# Patient Record
Sex: MALE | Race: BLACK OR AFRICAN AMERICAN | NOT HISPANIC OR LATINO | URBAN - METROPOLITAN AREA
[De-identification: names, ages, dates, MRNs, and addresses within clinical notes are randomized per-mention and may not be internally consistent; named-entity substitution may affect disease eponyms.]

---

## 2018-09-01 ENCOUNTER — EMERGENCY (EMERGENCY)
Facility: HOSPITAL | Age: 23
LOS: 1 days | Discharge: HOME | End: 2018-09-01
Attending: EMERGENCY MEDICINE
Payer: COMMERCIAL

## 2018-09-01 VITALS
HEART RATE: 67 BPM | TEMPERATURE: 98 F | SYSTOLIC BLOOD PRESSURE: 141 MMHG | HEIGHT: 68 IN | RESPIRATION RATE: 16 BRPM | DIASTOLIC BLOOD PRESSURE: 79 MMHG | WEIGHT: 164.91 LBS | OXYGEN SATURATION: 98 %

## 2018-09-01 PROCEDURE — 12011 RPR F/E/E/N/L/M 2.5 CM/<: CPT

## 2018-09-01 PROCEDURE — 99283 EMERGENCY DEPT VISIT LOW MDM: CPT | Mod: 25

## 2018-09-01 NOTE — ED ADULT NURSE NOTE - NSIMPLEMENTINTERV_GEN_ALL_ED
Implemented All Universal Safety Interventions:  [place] to call system. Call bell, personal items and telephone within reach. Instruct patient to call for assistance. Room bathroom lighting operational. Non-slip footwear when patient is off stretcher. Physically safe environment: no spills, clutter or unnecessary equipment. Stretcher in lowest position, wheels locked, appropriate side rails in place.

## 2018-09-01 NOTE — ED PROVIDER NOTE - ATTENDING CONTRIBUTION TO CARE
I took part in the care and plan of the patient and agree with the disposition. I took part in the care and plan of the patient and agree key physical exam findings and with the disposition. I performed the initial face to face bedside interview with this patient regarding history of present illness, review of symptoms and past medical, social and family history.  I completed an independent physical examination.  I was the initial provider who evaluated this patient.  The history, review of symptoms and examination was documented by the scribe in my presence and I attest to the accuracy of the documentation.  I have signed out the follow up of any pending tests (i.e. labs, radiological studies) to the PA/NP.  I have discussed the patient’s plan of care and disposition with the PA/NP

## 2018-09-01 NOTE — ED ADULT NURSE NOTE - OBJECTIVE STATEMENT
pt is here for lacerations.  c/o pain 5/10, pt stated that I played baseball, denied LOC, denied fever or sob, pt calm at this time, right eye lacerations.

## 2018-09-01 NOTE — ED PROVIDER NOTE - OBJECTIVE STATEMENT
23 year-old male, no significant PMHx, presents with cc R eyebrow laceration s/p being accidnetally hit with elbow while playing basketball earlier today. Denies any facial pain, visual changes, eyeball pain, pain or inability with eye movement, neck/back pain, other injuries or any other complaints. Last tetanus 4 year ago. 23 year-old male, no significant PMHx, presents with cc R eyebrow laceration s/p being accidnetally hit with elbow while playing basketball earlier today. Denies any facial pain, visual changes, eyeball pain, pain or inability with eye movement, neck/back pain, other injuries or any other complaints. Last tetanus 4 year ago.    josselin; laceration 23 year-old male, no significant PMHx, presents with cc R eyebrow laceration s/p being accidnetally hit with elbow while playing basketball earlier today. Denies any facial pain, visual changes, eyeball pain, pain or inability with eye movement, neck/back pain, other injuries or any other complaints. Last tetanus 4 year ago.    josselin SCHROEDER attending; 23 year old male with right eye laceration while playuing basketball- not elbowed in eyebrow. no LOC. PE with 1.5 cm laceration to right eyebrow. PERRLA, EOMI. remainder of head is atraumatic. a/p sutures, dc. return for suture removal in 5-7 days.

## 2018-09-01 NOTE — ED PROVIDER NOTE - PHYSICAL EXAMINATION
R eyebrow 1.5 cm superficial laceration. No maxillofacial bony tenderness. Pupils 5 mm with PERRL bilaterally. Neck supple with full range of motion. No spinal/paraspinal tenderness to palpation. No epistaxis or septal hematoma. No nasal bone tenderness or deformity. R eyebrow 1.5 cm superficial laceration. No maxillofacial bony tenderness. Pupils 5 mm with PERRL bilaterally. Neck supple with full range of motion. No spinal/paraspinal tenderness to palpation. No epistaxis or septal hematoma. No nasal bone tenderness or deformity.    josselin; right eyebrow laceration 1.5cm

## 2018-09-06 ENCOUNTER — EMERGENCY (EMERGENCY)
Facility: HOSPITAL | Age: 23
LOS: 1 days | Discharge: HOME | End: 2018-09-06
Attending: EMERGENCY MEDICINE
Payer: COMMERCIAL

## 2018-09-06 VITALS — WEIGHT: 160.06 LBS | HEIGHT: 69 IN

## 2018-09-06 VITALS
RESPIRATION RATE: 18 BRPM | OXYGEN SATURATION: 99 % | SYSTOLIC BLOOD PRESSURE: 116 MMHG | TEMPERATURE: 98 F | DIASTOLIC BLOOD PRESSURE: 67 MMHG | HEART RATE: 72 BPM

## 2018-09-06 PROCEDURE — G0463: CPT

## 2018-09-06 NOTE — ED PROVIDER NOTE - SKIN, MLM
Skin normal color for race, warm, dry and intact. No evidence of rash. 3 sutures present on right eyebrow.

## 2018-09-06 NOTE — ED PROVIDER NOTE - OBJECTIVE STATEMENT
24 y/o M with no significant PMHx or PSHx presents to the ED for suture removal. Patient states he was elbowed while playing basketball 5 days ago, sustaining laceration to right eyebrow. Patient denies fever, chills or any other complaints. Allergies: PCN.

## 2021-02-23 ENCOUNTER — APPOINTMENT (OUTPATIENT)
Dept: UROLOGY | Facility: CLINIC | Age: 26
End: 2021-02-23
Payer: COMMERCIAL

## 2021-02-23 VITALS
BODY MASS INDEX: 24.44 KG/M2 | HEART RATE: 44 BPM | SYSTOLIC BLOOD PRESSURE: 126 MMHG | TEMPERATURE: 98.7 F | WEIGHT: 165 LBS | HEIGHT: 69 IN | DIASTOLIC BLOOD PRESSURE: 64 MMHG

## 2021-02-23 PROCEDURE — 99072 ADDL SUPL MATRL&STAF TM PHE: CPT

## 2021-02-23 PROCEDURE — 99204 OFFICE O/P NEW MOD 45 MIN: CPT

## 2021-02-23 NOTE — HISTORY OF PRESENT ILLNESS
[FreeTextEntry1] : 24 y/o male presenting for initial visit for testicular lump. Pt had physical yesterday, incidental finding of right testicular lump by PMD. Pt was suggested to see a urologist. Pertinent medical history does not included DM, HTN, or HLD.\par \par Denies hematuria, dysuria, urgency and hesitancy. Good stream. \par \par O/E: circumcised phallus, adequate meatus, both testes descended, nontender, no mass palpable; 1 ml hard nodule on  posterior upper pole of left testes. No varicocele.\par FAINA: deferred \par \par Will get testicular sonogram, UA and culture\par Follow up in 1 week\par

## 2021-02-23 NOTE — ADDENDUM
[FreeTextEntry1] : I, Adri Innocent, acted solely as a scribe for Dr. Julián Pastor on this date, 02/23/2021.\par \par All medical record entries made by the Scribe were at my Dr. Julián Pastor direction and personally dictated by me on, 02/23/2021. I have reviewed the chart and agree that the record accurately reflects my personal performance of the history, physical exam, assessment and plan. I have also personally directed, reviewed and agreed with the chart.\par

## 2021-02-23 NOTE — PHYSICAL EXAM
[General Appearance - Well Developed] : well developed [General Appearance - Well Nourished] : well nourished [Normal Appearance] : normal appearance [Well Groomed] : well groomed [General Appearance - In No Acute Distress] : no acute distress [Edema] : no peripheral edema [Respiration, Rhythm And Depth] : normal respiratory rhythm and effort [Exaggerated Use Of Accessory Muscles For Inspiration] : no accessory muscle use [Abdomen Soft] : soft [Abdomen Tenderness] : non-tender [Costovertebral Angle Tenderness] : no ~M costovertebral angle tenderness [Urethral Meatus] : meatus normal [Urinary Bladder Findings] : the bladder was normal on palpation [Scrotum] : the scrotum was normal [Normal Station and Gait] : the gait and station were normal for the patient's age [] : no rash [No Focal Deficits] : no focal deficits [Oriented To Time, Place, And Person] : oriented to person, place, and time [Affect] : the affect was normal [Mood] : the mood was normal [Not Anxious] : not anxious [No Palpable Adenopathy] : no palpable adenopathy [Penis Abnormality] : normal circumcised penis [Epididymis] : the epididymides were normal [Testes Tenderness] : no tenderness of the testes [FreeTextEntry1] : 1 ml hard nodule on left posterior testes; FAINA: deferred

## 2021-02-23 NOTE — LETTER BODY
[Dear  ___] : Dear  [unfilled], [Consult Letter:] : I had the pleasure of evaluating your patient, [unfilled]. [Please see my note below.] : Please see my note below. [Consult Closing:] : Thank you very much for allowing me to participate in the care of this patient.  If you have any questions, please do not hesitate to contact me. [Sincerely,] : Sincerely, [FreeTextEntry3] : Julián Pastor MD

## 2021-02-24 LAB
APPEARANCE: CLEAR
BACTERIA: NEGATIVE
BILIRUBIN URINE: NEGATIVE
BLOOD URINE: ABNORMAL
COLOR: NORMAL
GLUCOSE QUALITATIVE U: NEGATIVE
HYALINE CASTS: 0 /LPF
KETONES URINE: NEGATIVE
LEUKOCYTE ESTERASE URINE: NEGATIVE
MICROSCOPIC-UA: NORMAL
NITRITE URINE: NEGATIVE
PH URINE: 6
PROTEIN URINE: NORMAL
RED BLOOD CELLS URINE: 1 /HPF
SPECIFIC GRAVITY URINE: 1.03
SQUAMOUS EPITHELIAL CELLS: 3 /HPF
UROBILINOGEN URINE: NORMAL
WHITE BLOOD CELLS URINE: 1 /HPF

## 2021-02-25 ENCOUNTER — OUTPATIENT (OUTPATIENT)
Dept: OUTPATIENT SERVICES | Facility: HOSPITAL | Age: 26
LOS: 1 days | End: 2021-02-25
Payer: COMMERCIAL

## 2021-02-25 ENCOUNTER — APPOINTMENT (OUTPATIENT)
Dept: ULTRASOUND IMAGING | Facility: CLINIC | Age: 26
End: 2021-02-25
Payer: COMMERCIAL

## 2021-02-25 DIAGNOSIS — N50.89 OTHER SPECIFIED DISORDERS OF THE MALE GENITAL ORGANS: ICD-10-CM

## 2021-02-25 PROCEDURE — 76870 US EXAM SCROTUM: CPT

## 2021-02-25 PROCEDURE — 76870 US EXAM SCROTUM: CPT | Mod: 26

## 2021-02-26 LAB — BACTERIA UR CULT: NORMAL

## 2021-03-04 ENCOUNTER — APPOINTMENT (OUTPATIENT)
Dept: UROLOGY | Facility: CLINIC | Age: 26
End: 2021-03-04
Payer: COMMERCIAL

## 2021-03-04 VITALS
BODY MASS INDEX: 24.44 KG/M2 | DIASTOLIC BLOOD PRESSURE: 80 MMHG | HEIGHT: 69 IN | RESPIRATION RATE: 17 BRPM | WEIGHT: 165 LBS | SYSTOLIC BLOOD PRESSURE: 126 MMHG | TEMPERATURE: 97.3 F

## 2021-03-04 DIAGNOSIS — I86.1 SCROTAL VARICES: ICD-10-CM

## 2021-03-04 DIAGNOSIS — N50.89 OTHER SPECIFIED DISORDERS OF THE MALE GENITAL ORGANS: ICD-10-CM

## 2021-03-04 DIAGNOSIS — N50.3 CYST OF EPIDIDYMIS: ICD-10-CM

## 2021-03-04 PROCEDURE — 99072 ADDL SUPL MATRL&STAF TM PHE: CPT

## 2021-03-04 PROCEDURE — 99213 OFFICE O/P EST LOW 20 MIN: CPT

## 2021-03-04 NOTE — HISTORY OF PRESENT ILLNESS
[FreeTextEntry1] : 26 y/o male presenting for initial visit for testicular lump. Pt had physical yesterday, incidental finding of right testicular lump by PMD. Pt was suggested to see a urologist. Pertinent medical history does not included DM, HTN, or HLD.\par \par Denies hematuria, dysuria, urgency and hesitancy. Good stream. \par \par O/E: circumcised phallus, adequate meatus, both testes descended, nontender, no mass palpable; 1 ml hard nodule on  posterior upper pole of left testes. No varicocele.\par FAINA: deferred \par \par Will get testicular sonogram, UA and culture\par Follow up in 1 week\par \par 03/04/2021; Pt is here today for follow up for testicular lump. UA: microhematuria; Culture: negative\par Testicular sonogram on 02/25/2021: 4 mm epididymal head cyst. Borderline left varicocele. No evidence of intratesticular mass.\par \par Pt is doing well. Denies hematuria, dysuria, urgency and hesitancy. \par \par O/E: circumcised phallus, adequate meatus, both testes descended, nontender, no mass palpable\par FAINA: deferred\par \par Will get Urine dipstick\par Follow up PRN\par \par \par \par

## 2021-03-04 NOTE — ADDENDUM
[FreeTextEntry1] : I, Adri Innocent, acted solely as a scribe for Dr. Julián Pastor on this date, 03/04/2021.\par \par All medical record entries made by the Scribe were at my Dr. Julián Pastor direction and personally dictated by me on, 03/04/2021. I have reviewed the chart and agree that the record accurately reflects my personal performance of the history, physical exam, assessment and plan. I have also personally directed, reviewed and agreed with the chart.\par

## 2021-03-04 NOTE — PHYSICAL EXAM
[General Appearance - Well Developed] : well developed [General Appearance - Well Nourished] : well nourished [Normal Appearance] : normal appearance [Well Groomed] : well groomed [General Appearance - In No Acute Distress] : no acute distress [Abdomen Soft] : soft [Abdomen Tenderness] : non-tender [Costovertebral Angle Tenderness] : no ~M costovertebral angle tenderness [Urethral Meatus] : meatus normal [Penis Abnormality] : normal circumcised penis [Urinary Bladder Findings] : the bladder was normal on palpation [Scrotum] : the scrotum was normal [Epididymis] : the epididymides were normal [Testes Tenderness] : no tenderness of the testes [Edema] : no peripheral edema [] : no respiratory distress [Respiration, Rhythm And Depth] : normal respiratory rhythm and effort [Exaggerated Use Of Accessory Muscles For Inspiration] : no accessory muscle use [Oriented To Time, Place, And Person] : oriented to person, place, and time [Affect] : the affect was normal [Mood] : the mood was normal [Not Anxious] : not anxious [Normal Station and Gait] : the gait and station were normal for the patient's age [No Focal Deficits] : no focal deficits [No Palpable Adenopathy] : no palpable adenopathy [FreeTextEntry1] : 1 ml hard nodule on left posterior testes; FAINA: deferred

## 2022-11-24 NOTE — ED PROVIDER NOTE - DR. NAME
Pt provided discharge instructions and education. Pt sts understanding of instructions and education. Pt sts no further questions at this time.      Ivan Larson)

## 2022-12-01 ENCOUNTER — APPOINTMENT (OUTPATIENT)
Dept: INTERNAL MEDICINE | Facility: CLINIC | Age: 27
End: 2022-12-01

## 2022-12-01 ENCOUNTER — NON-APPOINTMENT (OUTPATIENT)
Age: 27
End: 2022-12-01

## 2022-12-01 VITALS
DIASTOLIC BLOOD PRESSURE: 82 MMHG | HEART RATE: 72 BPM | HEIGHT: 68.5 IN | BODY MASS INDEX: 24.72 KG/M2 | SYSTOLIC BLOOD PRESSURE: 145 MMHG | OXYGEN SATURATION: 99 % | WEIGHT: 165 LBS

## 2022-12-01 DIAGNOSIS — M25.569 PAIN IN UNSPECIFIED KNEE: ICD-10-CM

## 2022-12-01 DIAGNOSIS — M54.50 LOW BACK PAIN, UNSPECIFIED: ICD-10-CM

## 2022-12-01 PROCEDURE — 99203 OFFICE O/P NEW LOW 30 MIN: CPT

## 2022-12-01 NOTE — HISTORY OF PRESENT ILLNESS
[FreeTextEntry1] : New patient to me\par Patient came today to get letter to go back to job [de-identified] : Patient is 27 year male  with no significant PMH came today to get clearance letter to go back to work after 10 day not being at work \par As per patient he has low back pain and knee pain as his job is physical, did not see any physician for that reason\par Now he feels good and need letter to go back to work

## 2023-01-29 ENCOUNTER — NON-APPOINTMENT (OUTPATIENT)
Age: 28
End: 2023-01-29